# Patient Record
Sex: FEMALE | Race: NATIVE HAWAIIAN OR OTHER PACIFIC ISLANDER | NOT HISPANIC OR LATINO
[De-identification: names, ages, dates, MRNs, and addresses within clinical notes are randomized per-mention and may not be internally consistent; named-entity substitution may affect disease eponyms.]

---

## 2023-07-01 ENCOUNTER — RESULT REVIEW (OUTPATIENT)
Age: 38
End: 2023-07-01

## 2023-07-02 ENCOUNTER — INPATIENT (INPATIENT)
Facility: HOSPITAL | Age: 38
LOS: 0 days | Discharge: HOME | DRG: 819 | End: 2023-07-03
Attending: OBSTETRICS & GYNECOLOGY | Admitting: OBSTETRICS & GYNECOLOGY
Payer: COMMERCIAL

## 2023-07-02 ENCOUNTER — TRANSCRIPTION ENCOUNTER (OUTPATIENT)
Age: 38
End: 2023-07-02

## 2023-07-02 VITALS
HEART RATE: 97 BPM | WEIGHT: 184.97 LBS | SYSTOLIC BLOOD PRESSURE: 122 MMHG | DIASTOLIC BLOOD PRESSURE: 72 MMHG | HEIGHT: 66 IN | TEMPERATURE: 98 F | RESPIRATION RATE: 15 BRPM | OXYGEN SATURATION: 99 %

## 2023-07-02 DIAGNOSIS — O00.109 UNSPECIFIED TUBAL PREGNANCY WITHOUT INTRAUTERINE PREGNANCY: ICD-10-CM

## 2023-07-02 LAB
ALBUMIN SERPL ELPH-MCNC: 3.9 G/DL — SIGNIFICANT CHANGE UP (ref 3.3–5)
ALP SERPL-CCNC: 57 U/L — SIGNIFICANT CHANGE UP (ref 40–120)
ALT FLD-CCNC: 15 U/L — SIGNIFICANT CHANGE UP (ref 10–45)
ANION GAP SERPL CALC-SCNC: 12 MMOL/L — SIGNIFICANT CHANGE UP (ref 5–17)
APPEARANCE UR: CLEAR — SIGNIFICANT CHANGE UP
APTT BLD: 27.8 SEC — SIGNIFICANT CHANGE UP (ref 27.5–35.5)
AST SERPL-CCNC: 15 U/L — SIGNIFICANT CHANGE UP (ref 10–40)
BACTERIA # UR AUTO: NEGATIVE — SIGNIFICANT CHANGE UP
BASOPHILS # BLD AUTO: 0.03 K/UL — SIGNIFICANT CHANGE UP (ref 0–0.2)
BASOPHILS NFR BLD AUTO: 0.4 % — SIGNIFICANT CHANGE UP (ref 0–2)
BILIRUB SERPL-MCNC: 0.5 MG/DL — SIGNIFICANT CHANGE UP (ref 0.2–1.2)
BILIRUB UR-MCNC: NEGATIVE — SIGNIFICANT CHANGE UP
BLD GP AB SCN SERPL QL: NEGATIVE — SIGNIFICANT CHANGE UP
BUN SERPL-MCNC: 12 MG/DL — SIGNIFICANT CHANGE UP (ref 7–23)
CALCIUM SERPL-MCNC: 9.1 MG/DL — SIGNIFICANT CHANGE UP (ref 8.4–10.5)
CHLORIDE SERPL-SCNC: 104 MMOL/L — SIGNIFICANT CHANGE UP (ref 96–108)
CO2 SERPL-SCNC: 23 MMOL/L — SIGNIFICANT CHANGE UP (ref 22–31)
COLOR SPEC: SIGNIFICANT CHANGE UP
CREAT SERPL-MCNC: 0.63 MG/DL — SIGNIFICANT CHANGE UP (ref 0.5–1.3)
DIFF PNL FLD: NEGATIVE — SIGNIFICANT CHANGE UP
EGFR: 116 ML/MIN/1.73M2 — SIGNIFICANT CHANGE UP
EOSINOPHIL # BLD AUTO: 0.12 K/UL — SIGNIFICANT CHANGE UP (ref 0–0.5)
EOSINOPHIL NFR BLD AUTO: 1.6 % — SIGNIFICANT CHANGE UP (ref 0–6)
EPI CELLS # UR: 2 /HPF — SIGNIFICANT CHANGE UP
GLUCOSE SERPL-MCNC: 103 MG/DL — HIGH (ref 70–99)
GLUCOSE UR QL: NEGATIVE — SIGNIFICANT CHANGE UP
HCG SERPL-ACNC: 6043 MIU/ML — HIGH
HCT VFR BLD CALC: 34.4 % — LOW (ref 34.5–45)
HGB BLD-MCNC: 11.1 G/DL — LOW (ref 11.5–15.5)
HYALINE CASTS # UR AUTO: 0 /LPF — SIGNIFICANT CHANGE UP (ref 0–2)
IMM GRANULOCYTES NFR BLD AUTO: 0.3 % — SIGNIFICANT CHANGE UP (ref 0–0.9)
INR BLD: 1.03 RATIO — SIGNIFICANT CHANGE UP (ref 0.88–1.16)
KETONES UR-MCNC: NEGATIVE — SIGNIFICANT CHANGE UP
LEUKOCYTE ESTERASE UR-ACNC: NEGATIVE — SIGNIFICANT CHANGE UP
LYMPHOCYTES # BLD AUTO: 2.05 K/UL — SIGNIFICANT CHANGE UP (ref 1–3.3)
LYMPHOCYTES # BLD AUTO: 27.1 % — SIGNIFICANT CHANGE UP (ref 13–44)
MCHC RBC-ENTMCNC: 31.5 PG — SIGNIFICANT CHANGE UP (ref 27–34)
MCHC RBC-ENTMCNC: 32.3 GM/DL — SIGNIFICANT CHANGE UP (ref 32–36)
MCV RBC AUTO: 97.7 FL — SIGNIFICANT CHANGE UP (ref 80–100)
MONOCYTES # BLD AUTO: 0.28 K/UL — SIGNIFICANT CHANGE UP (ref 0–0.9)
MONOCYTES NFR BLD AUTO: 3.7 % — SIGNIFICANT CHANGE UP (ref 2–14)
NEUTROPHILS # BLD AUTO: 5.07 K/UL — SIGNIFICANT CHANGE UP (ref 1.8–7.4)
NEUTROPHILS NFR BLD AUTO: 66.9 % — SIGNIFICANT CHANGE UP (ref 43–77)
NITRITE UR-MCNC: NEGATIVE — SIGNIFICANT CHANGE UP
NRBC # BLD: 0 /100 WBCS — SIGNIFICANT CHANGE UP (ref 0–0)
PH UR: 7 — SIGNIFICANT CHANGE UP (ref 5–8)
PLATELET # BLD AUTO: 295 K/UL — SIGNIFICANT CHANGE UP (ref 150–400)
POTASSIUM SERPL-MCNC: 4.6 MMOL/L — SIGNIFICANT CHANGE UP (ref 3.5–5.3)
POTASSIUM SERPL-SCNC: 4.6 MMOL/L — SIGNIFICANT CHANGE UP (ref 3.5–5.3)
PROT SERPL-MCNC: 7 G/DL — SIGNIFICANT CHANGE UP (ref 6–8.3)
PROT UR-MCNC: SIGNIFICANT CHANGE UP
PROTHROM AB SERPL-ACNC: 11.9 SEC — SIGNIFICANT CHANGE UP (ref 10.5–13.4)
RBC # BLD: 3.52 M/UL — LOW (ref 3.8–5.2)
RBC # FLD: 13.9 % — SIGNIFICANT CHANGE UP (ref 10.3–14.5)
RBC CASTS # UR COMP ASSIST: 2 /HPF — SIGNIFICANT CHANGE UP (ref 0–4)
RH IG SCN BLD-IMP: POSITIVE — SIGNIFICANT CHANGE UP
SODIUM SERPL-SCNC: 139 MMOL/L — SIGNIFICANT CHANGE UP (ref 135–145)
SP GR SPEC: 1.02 — SIGNIFICANT CHANGE UP (ref 1.01–1.02)
UROBILINOGEN FLD QL: NEGATIVE — SIGNIFICANT CHANGE UP
WBC # BLD: 7.57 K/UL — SIGNIFICANT CHANGE UP (ref 3.8–10.5)
WBC # FLD AUTO: 7.57 K/UL — SIGNIFICANT CHANGE UP (ref 3.8–10.5)
WBC UR QL: 1 /HPF — SIGNIFICANT CHANGE UP (ref 0–5)

## 2023-07-02 PROCEDURE — 71045 X-RAY EXAM CHEST 1 VIEW: CPT | Mod: 26

## 2023-07-02 PROCEDURE — 99285 EMERGENCY DEPT VISIT HI MDM: CPT

## 2023-07-02 PROCEDURE — 88305 TISSUE EXAM BY PATHOLOGIST: CPT | Mod: 26

## 2023-07-02 PROCEDURE — 76817 TRANSVAGINAL US OBSTETRIC: CPT | Mod: 26

## 2023-07-02 PROCEDURE — 59151 TREAT ECTOPIC PREGNANCY: CPT

## 2023-07-02 PROCEDURE — 93975 VASCULAR STUDY: CPT | Mod: 26

## 2023-07-02 RX ORDER — HYDROMORPHONE HYDROCHLORIDE 2 MG/ML
0.5 INJECTION INTRAMUSCULAR; INTRAVENOUS; SUBCUTANEOUS
Refills: 0 | Status: DISCONTINUED | OUTPATIENT
Start: 2023-07-03 | End: 2023-07-03

## 2023-07-02 RX ORDER — ONDANSETRON 8 MG/1
4 TABLET, FILM COATED ORAL ONCE
Refills: 0 | Status: DISCONTINUED | OUTPATIENT
Start: 2023-07-03 | End: 2023-07-03

## 2023-07-02 RX ORDER — SODIUM CHLORIDE 9 MG/ML
1000 INJECTION, SOLUTION INTRAVENOUS
Refills: 0 | Status: DISCONTINUED | OUTPATIENT
Start: 2023-07-02 | End: 2023-07-02

## 2023-07-02 RX ORDER — SODIUM CHLORIDE 9 MG/ML
1000 INJECTION, SOLUTION INTRAVENOUS ONCE
Refills: 0 | Status: COMPLETED | OUTPATIENT
Start: 2023-07-02 | End: 2023-07-02

## 2023-07-02 RX ADMIN — SODIUM CHLORIDE 1000 MILLILITER(S): 9 INJECTION, SOLUTION INTRAVENOUS at 20:17

## 2023-07-02 NOTE — ED PROVIDER NOTE - OBJECTIVE STATEMENT
38-year-old female G3, P1 last menstrual period 4–15–23 presents to the emergency department after being told by her OB/GYN that she had a right tubal ectopic pregnancy on 6/24/2023.  Patient went to [facility] and was told to take a dose of methotrexate, recieved a dose on 6/34/23, second dose on 6/27/23 and third on 7/1/23.  Patient's HCG on 6/22 was 4181, 6/20 91550, 7/1 was 9/3/2004. patient was told she needs surgery and presents for second opinion 38-year-old female G3, P1 last menstrual period 4–15–23 presents to the emergency department after being told by her OB/GYN that she had a right tubal ectopic pregnancy on 6/24/2023.  Patient went to hospitals and was told to take a dose of methotrexate, recieved a dose on 6/34/23, second dose on 6/27/23 and third on 7/1/23.  Patient's HCG on 6/22 was 4181, 6/20 72509, 7/1 was 9/3/2004. patient was told she needs surgery and presents for second opinion. patient is not in pain. no vag discharge/ vag bleeding/ dysuria.

## 2023-07-02 NOTE — H&P ADULT - ASSESSMENT
37y/o  s/p MTX x3 at [facility] with persistently elevated bHCG with inappropriate downtrend and TVUS with FH. Patient in stable condition, VSS, no acute abdominal pain.   - Pt added on to OR schedule for LSC R salpingectomy  - Consents signed for laparoscopic left salpingectomy, possible left oophorectomy, possible right salpingectomy, possible laparotomy. Risks/benefits/alteratives explained to pt and partner, including bleeding, infection, damage to surrounding organs, and pain. Questions elicited and answered.   - NPO, LR@125  - Rh +, rhogam not indicated     D/W Dr. Adrianna Chaudhry PGY3

## 2023-07-02 NOTE — ED PROVIDER NOTE - CLINICAL SUMMARY MEDICAL DECISION MAKING FREE TEXT BOX
38-year-old female G3, P1 last menstrual period 4–15–23 presents to the emergency department after being told by her OB/GYN that she had a right tubal ectopic pregnancy on 6/24/2023. Concern for ectopic pregnancy. will get labs, US, consult obgyn for management options. patient not currently in pain and not requiring meds.

## 2023-07-02 NOTE — ED ADULT TRIAGE NOTE - CHIEF COMPLAINT QUOTE
pt dx with an ectopic had Methotrexate 3 doses already wants a second opinion  pt has no pain or bleeding Pt levels are not going down as expected possible surgery ans wants second opinion

## 2023-07-02 NOTE — ED PROVIDER NOTE - ATTENDING CONTRIBUTION TO CARE
Attending MD Lay: I personally have seen and examined this patient.  Resident note reviewed and agree on plan of care and except where noted.  See below for details.     Seen in [facility], accompanied by bernard  Ob/Gyn Dr. Simona Shipman    38F with no reported contributory PMH/Meds, PSHx , no known drug allergies presents to the ED with ectopic pregnancy.  Reports took home pregnancy around  and then saw her Ob Gyn who diagnosed her with ectopic and sent her to hospital.  .  Reports went to [facility] and had US and labs and had MTX on ,  and .  Reports was told HCG not decreasing sufficiently.  Reports is scheduled for D&C tomorrow but requests second opinion.  Mireille did not have abdominal pain, vaginal bleeding at any point and only knew about ectopic secondary to her Ob/Gyn telling her.  Denies chest pain, shortness of breath, abdominal pain, nausea, vomiting, diarrhea, urinary complaints, vaginal bleeding, vaginal discharge.  Reports first miscarriage was around 5 wks and was last year.    Exam:   General: NAD  HENT: head NCAT, airway patent  Eyes: anicteric, no conjunctival injection   Lungs: lungs CTAB with good inspiratory effort, no wheezing, no rhonchi, no rales  Cardiac: +S1S2, no obvious m/r/g  GI: abdomen soft with +BS, NT, ND  : no CVAT  MSK: ranging neck and extremities freely  Neuro: moving all extremities spontaneously, nonfocal  Psych: normal mood and affect     A/P: 38F with known outpatient ectopic, ?R, will obtain labs, US, and consult Ob/Gyn as needed Attending MD Lay: I personally have seen and examined this patient.  Resident note reviewed and agree on plan of care and except where noted.  See below for details.     Seen in [place] [facility], accompanied by bernard  Ob/Gyn Dr. Simona Shipman    38F with no reported contributory PMH/Meds, PSHx , no known drug allergies presents to the ED with ectopic pregnancy.  Reports took home pregnancy around  and then saw her Ob Gyn who diagnosed her with ectopic and sent her to hospital.  .  Reports went to [facility] and had US and labs and had MTX on ,  and .  Reports was told HCG not decreasing sufficiently.  Reports is scheduled for D&C tomorrow but requests second opinion.  Mireille did not have abdominal pain, vaginal bleeding at any point and only knew about ectopic secondary to her Ob/Gyn telling her.  Denies chest pain, shortness of breath, abdominal pain, nausea, vomiting, diarrhea, urinary complaints, vaginal bleeding, vaginal discharge.  Reports first miscarriage was around 5 wks and was last year.  LMP 4/15/23    Exam:   General: NAD  HENT: head NCAT, airway patent  Eyes: anicteric, no conjunctival injection   Lungs: lungs CTAB with good inspiratory effort, no wheezing, no rhonchi, no rales  Cardiac: +S1S2, no obvious m/r/g  GI: abdomen soft with +BS, NT, ND  : no CVAT  MSK: ranging neck and extremities freely  Neuro: moving all extremities spontaneously, nonfocal  Psych: normal mood and affect     A/P: 38F with known outpatient ectopic, ?R, will obtain labs, US, and consult Ob/Gyn as needed

## 2023-07-02 NOTE — PRE-ANESTHESIA EVALUATION ADULT - NSANTHPMHFT_GEN_ALL_CORE
39 y/o female;  right tubal ectopic pregnancy on 6/24/2023.  a dose of methotrexate was received  on 6/34/23, second dose on 6/27/23 and third on 7/1/23. with persistent elevated HCG  US pelvis:  right adnexal ectopic pregnancy. Trace amount of fluid within the pelvis without evidence of adnexal hematoma.

## 2023-07-02 NOTE — ED PROVIDER NOTE - PHYSICAL EXAMINATION
PHYSICAL EXAM:  CONSTITUTIONAL: Well appearing, awake, alert, oriented to person, place, time/situation and in no apparent distress.  HEAD: Atraumatic  EYES: Clear bilaterally, pupils equal, round and reactive to light.  ENMT: Airway patent, Nasal mucosa clear. Mouth with normal mucosa. Uvula is midline.   CARDIAC: Normal rate, regular rhythm. +S1/S2. No murmurs, rubs or gallops.  RESPIRATORY: Breathing unlabored. Breath sounds clear and equal bilaterally.  ABDOMEN:  Soft, nontender, nondistended. No rebound tenderness or guarding.  NEUROLOGICAL: Alert and oriented, no focal deficits, no motor or sensory deficits. Sensation intact x4 extremities.  SKIN: Skin warm and dry. No evidence of rashes or lesions.

## 2023-07-02 NOTE — ED ADULT NURSE NOTE - OBJECTIVE STATEMENT
37 y/o F no pertinent PMH presented to ED for ectopic pregnancy follow up. Pt states she has been being treated for an ectopic pregnancy at [facility], states her hcg levels are decreasing but not as rapidly as they should be, has had three doses of methotrexate, last dose was last night, pt states [facility] MDs have recommended surgery but she wanted a second opinion so came to [facility] ED for eval. Pt has no physical complaints at this time, denies any vaginal bleeding or discharge, abdominal cramping, N/V, blurry vision, headaches, chest pain, difficulty breathing, GI/ symptoms at this time. A&Ox4, breathing spontaneously and unlabored, speaking full sentences, moving all extremities, is ambulatory. Appropriate safety measures in place, call bell within reach, family at bedside. 37 y/o F no pertinent PMH  presented to ED for ectopic pregnancy follow up. Pt states she has been being treated for an ectopic pregnancy at [facility], states her hcg levels are decreasing but not as rapidly as they should be, has had three doses of methotrexate, last dose was last night, pt states [facility] MDs have recommended surgery but she wanted a second opinion so came to [facility] ED for eval. Pt has no physical complaints at this time, denies any vaginal bleeding or discharge, abdominal cramping, N/V, blurry vision, headaches, chest pain, difficulty breathing, GI/ symptoms at this time. A&Ox4, breathing spontaneously and unlabored, speaking full sentences, moving all extremities, is ambulatory. Appropriate safety measures in place, call bell within reach, family at bedside.

## 2023-07-02 NOTE — PRE-ANESTHESIA EVALUATION ADULT - NSANTHOSAYNRD_GEN_A_CORE
No. CAYETANO screening performed.  STOP BANG Legend: 0-2 = LOW Risk; 3-4 = INTERMEDIATE Risk; 5-8 = HIGH Risk

## 2023-07-02 NOTE — H&P ADULT - NSHPPHYSICALEXAM_GEN_ALL_CORE
Vital Signs Last 24 Hrs  T(C): 36.7 (02 Jul 2023 20:56), Max: 36.7 (02 Jul 2023 14:39)  T(F): 98.1 (02 Jul 2023 19:26), Max: 98.1 (02 Jul 2023 19:26)  HR: 85 (02 Jul 2023 20:56) (85 - 97)  BP: 110/70 (02 Jul 2023 20:56) (110/70 - 122/72)  BP(mean): 82 (02 Jul 2023 20:56) (82 - 82)  RR: 18 (02 Jul 2023 20:56) (15 - 18)  SpO2: 100% (02 Jul 2023 20:56) (99% - 100%)    Parameters below as of 02 Jul 2023 19:26  Patient On (Oxygen Delivery Method): room air      Physical Exam:   General: sitting comfortably in bed, NAD   CV: RRR  Lungs: CTAB  Back: No CVA tenderness  Abd: Soft, non-tender, non-distended.  Bowel sounds present.    Ext: non-tender b/l, no edema

## 2023-07-02 NOTE — H&P ADULT - HISTORY OF PRESENT ILLNESS
39y/o  LMP 4/ / presenting to the ED complaining with a known ectopic pregnancy for a second opinion.   Patient reports ___. She denies ___.    Name of GYN Physician:     OBHx:     - SAB x1  - pC/S ()  GYNHx: Denies fibroids, cysts, endometriosis, STI's, Abnormal pap smears   PMHx: Denies  PSHx: C/S x1  Meds: None  Allergies: NKDA 39y/o  LMP  presenting to the ED complaining with a known ectopic pregnancy for a second opinion. She was being followed in [facility] and received MTX on , , and .  Patient denies abdominal pain, vaginal bleeding, lightheadedness, dizziness, nausea, vomiting.    Name of GYN Physician: unknown, affiliated with [facility]    OBHx:     - SAB x1  - pC/S ()  GYNHx: Denies fibroids, cysts, endometriosis, STI's, Abnormal pap smears   PMHx: Denies  PSHx: C/S x1  Meds: None  Allergies: NKDA

## 2023-07-02 NOTE — H&P ADULT - NSHPLABSRESULTS_GEN_ALL_CORE
LABS:               11.1   7.57  )-----------( 295      ( 2023 16:33 )             34.4     07-02  139  |  104  |  12  ----------------------------<  103<H>  4.6   |  23  |  0.63    Ca    9.1      2023 16:33  TPro  7.0  /  Alb  3.9  /  TBili  0.5  /  DBili  x   /  AST  15  /  ALT  15  /  AlkPhos  57  07-02  PT/INR - ( 2023 16:33 )   PT: 11.9 sec;   INR: 1.03 ratio    PTT - ( 2023 16:33 )  PTT:27.8 sec    Urinalysis Basic - ( 2023 16:41 )  Color: Light Yellow / Appearance: Clear / S.022 / pH: x  Gluc: x / Ketone: Negative  / Bili: Negative / Urobili: Negative   Blood: x / Protein: Trace / Nitrite: Negative   Leuk Esterase: Negative / RBC: 2 /hpf / WBC 1 /HPF   Sq Epi: x / Non Sq Epi: x / Bacteria: Negative    RADIOLOGY & ADDITIONAL STUDIES: LABS:               11.1   7.57  )-----------( 295      ( 2023 16:33 )             34.4     07-02  139  |  104  |  12  ----------------------------<  103<H>  4.6   |  23  |  0.63    Ca    9.1      2023 16:33  TPro  7.0  /  Alb  3.9  /  TBili  0.5  /  DBili  x   /  AST  15  /  ALT  15  /  AlkPhos  57  07-02  PT/INR - ( 2023 16:33 )   PT: 11.9 sec;   INR: 1.03 ratio    PTT - ( 2023 16:33 )  PTT:27.8 sec    Urinalysis Basic - ( 2023 16:41 )  Color: Light Yellow / Appearance: Clear / S.022 / pH: x  Gluc: x / Ketone: Negative  / Bili: Negative / Urobili: Negative   Blood: x / Protein: Trace / Nitrite: Negative   Leuk Esterase: Negative / RBC: 2 /hpf / WBC 1 /HPF   Sq Epi: x / Non Sq Epi: x / Bacteria: Negative    RADIOLOGY & ADDITIONAL STUDIES:  TVUS ():  FINDINGS:  Uterus: No intrauterine pregnancy is identified. The endometrial lining   measures 8 mm. There is 1.5 cm left intramural myoma    Right ovary: 3.3 cm x 1.9 cm x 2.4 cm. Within normal limits.    There is live ectopically pregnancy in the right adnexa with overall   measurement of 1.9 x 1.8 cm. The central cystic component measures 8mm.   The fetal pole measures 4 mm, corresponding to 6 weeks gestation.. Fetal   heart rate is identified. There is no adjacent hematoma.    Left ovary: 4.1 cm x 3.5 cm x 3.2 cm. There is 2.9 cm simple appearing   left ovarian cyst.    Fluid: Trace amount of fluid in the pelvis. There is no hematoma.    IMPRESSION:  Live right adnexal ectopic pregnancy.    Trace amount of fluid within the pelvis without evidence of adnexal   hematoma.

## 2023-07-02 NOTE — ED PROVIDER NOTE - PROGRESS NOTE DETAILS
Attending MD Lay: Received call from rads, +R ectopic, Ob/Gyn paged Attending MD Lay: Spoke to Ob/Gyn, will come evaluate patient Attending MD Lay: Patient evaluated by Ob/Gyn team, will admit to Dr. Flores, for OR

## 2023-07-03 VITALS
RESPIRATION RATE: 17 BRPM | HEART RATE: 91 BPM | SYSTOLIC BLOOD PRESSURE: 118 MMHG | DIASTOLIC BLOOD PRESSURE: 73 MMHG | TEMPERATURE: 97 F | OXYGEN SATURATION: 99 %

## 2023-07-03 PROCEDURE — 88305 TISSUE EXAM BY PATHOLOGIST: CPT

## 2023-07-03 PROCEDURE — 76817 TRANSVAGINAL US OBSTETRIC: CPT

## 2023-07-03 PROCEDURE — 99285 EMERGENCY DEPT VISIT HI MDM: CPT

## 2023-07-03 PROCEDURE — 93975 VASCULAR STUDY: CPT

## 2023-07-03 PROCEDURE — 86901 BLOOD TYPING SEROLOGIC RH(D): CPT

## 2023-07-03 PROCEDURE — 85025 COMPLETE CBC W/AUTO DIFF WBC: CPT

## 2023-07-03 PROCEDURE — 84702 CHORIONIC GONADOTROPIN TEST: CPT

## 2023-07-03 PROCEDURE — C9399: CPT

## 2023-07-03 PROCEDURE — 86900 BLOOD TYPING SEROLOGIC ABO: CPT

## 2023-07-03 PROCEDURE — 85730 THROMBOPLASTIN TIME PARTIAL: CPT

## 2023-07-03 PROCEDURE — 86850 RBC ANTIBODY SCREEN: CPT

## 2023-07-03 PROCEDURE — 71045 X-RAY EXAM CHEST 1 VIEW: CPT

## 2023-07-03 PROCEDURE — 85610 PROTHROMBIN TIME: CPT

## 2023-07-03 PROCEDURE — 87086 URINE CULTURE/COLONY COUNT: CPT

## 2023-07-03 PROCEDURE — 80053 COMPREHEN METABOLIC PANEL: CPT

## 2023-07-03 PROCEDURE — 93005 ELECTROCARDIOGRAM TRACING: CPT

## 2023-07-03 PROCEDURE — 81001 URINALYSIS AUTO W/SCOPE: CPT

## 2023-07-03 RX ORDER — ACETAMINOPHEN 500 MG
3 TABLET ORAL
Qty: 0 | Refills: 0 | DISCHARGE

## 2023-07-03 RX ORDER — ONDANSETRON 8 MG/1
4 TABLET, FILM COATED ORAL ONCE
Refills: 0 | Status: DISCONTINUED | OUTPATIENT
Start: 2023-07-03 | End: 2023-07-03

## 2023-07-03 RX ORDER — OXYCODONE HYDROCHLORIDE 5 MG/1
10 TABLET ORAL ONCE
Refills: 0 | Status: DISCONTINUED | OUTPATIENT
Start: 2023-07-03 | End: 2023-07-03

## 2023-07-03 RX ORDER — OXYCODONE HYDROCHLORIDE 5 MG/1
1 TABLET ORAL
Qty: 8 | Refills: 0
Start: 2023-07-03

## 2023-07-03 RX ORDER — OXYCODONE HYDROCHLORIDE 5 MG/1
5 TABLET ORAL ONCE
Refills: 0 | Status: DISCONTINUED | OUTPATIENT
Start: 2023-07-03 | End: 2023-07-03

## 2023-07-03 RX ORDER — SODIUM CHLORIDE 9 MG/ML
1000 INJECTION, SOLUTION INTRAVENOUS
Refills: 0 | Status: DISCONTINUED | OUTPATIENT
Start: 2023-07-03 | End: 2023-07-03

## 2023-07-03 RX ORDER — IBUPROFEN 200 MG
1 TABLET ORAL
Qty: 0 | Refills: 0 | DISCHARGE

## 2023-07-03 RX ADMIN — HYDROMORPHONE HYDROCHLORIDE 0.5 MILLIGRAM(S): 2 INJECTION INTRAMUSCULAR; INTRAVENOUS; SUBCUTANEOUS at 02:30

## 2023-07-03 RX ADMIN — HYDROMORPHONE HYDROCHLORIDE 0.5 MILLIGRAM(S): 2 INJECTION INTRAMUSCULAR; INTRAVENOUS; SUBCUTANEOUS at 02:15

## 2023-07-03 RX ADMIN — SODIUM CHLORIDE 125 MILLILITER(S): 9 INJECTION, SOLUTION INTRAVENOUS at 02:19

## 2023-07-03 NOTE — BRIEF OPERATIVE NOTE - OPERATION/FINDINGS
On exam under anesthesia, 10wk size mobile uterus palpated, no masses appreciated in the adnexa  On laparoscopy, mass identified in the right fallopian tube

## 2023-07-03 NOTE — ASU DISCHARGE PLAN (ADULT/PEDIATRIC) - CARE PROVIDER_API CALL
[facility],   Oncology [facility]  [address] [place] [address]  [phone]  Phone: (   )    -  Fax: (   )    -  Follow Up Time:    [facility],   [address] [place] [address] [address], [place] [address] [phone]  Phone: (   )    -  Fax: (   )    -  Follow Up Time:

## 2023-07-03 NOTE — ASU DISCHARGE PLAN (ADULT/PEDIATRIC) - PROVIDER TOKENS
FREE:[LAST:[[facility]],PHONE:[(   )    -],FAX:[(   )    -],ADDRESS:[[place] [address]  [phone]]] FREE:[LAST:[[facility]],PHONE:[(   )    -],FAX:[(   )    -],ADDRESS:[[address] [place] [address] [address], [place] [address] [phone]]]

## 2023-07-03 NOTE — CHART NOTE - NSCHARTNOTEFT_GEN_A_CORE
R1 OBGYN POST-OP CHECK    S: Patient seen and evaluated at bedside.  Pt awake and alert resting comfortably in bed  Patient reports pain controlled with analgesia. Pt denies N/V, SOB, CP, palpitations, fever/chills. Tolerating clears. +OOB. Voiding spontaneously.    O:   T(C): 36.2 (07-03-23 @ 02:30), Max: 36.2 (07-03-23 @ 02:30)  HR: 97 (07-03-23 @ 03:32) (82 - 101)  BP: 133/69 (07-03-23 @ 03:32) (99/56 - 133/69)  RR: 16 (07-03-23 @ 03:32) (14 - 16)  SpO2: 99% (07-03-23 @ 03:32) (94% - 99%)  Wt(kg): --  I&O's Summary    02 Jul 2023 07:01  -  03 Jul 2023 04:06  --------------------------------------------------------  IN: 370 mL / OUT: 0 mL / NET: 370 mL        Gen: Resting comfortably in bed, NAD  CV: S1S2, RRR  Lungs: CTA B/L  Abd: soft, appropriately tender, BS x 4 quadrants.    Inc: 3 port sites clean/dry/intact with dermabond in place  Ext: SCD's in place and functional, non-tender b/l, no edema        A/P: 38y Female s/p LSC R salpingectomy, EBL 5. Patient is clinically stable and doing well postoperatively.    Neuro: PO Analgesia PRN   CV: Hemodynamically stable.   Pulm: Saturating well on room air.  Encourage OOB and incentive spirometer use.   GI: Advance to regular diet. Anti-emetics PRN.  : Voiding spontaneously with adequate urine output of 250cc  FEN: Electrolytes: LR@125cc/hr.   Heme: DVT ppx w/ SCD's while in bed. Early ambulation, initially with assistance then as tolerated.   ID: Afebrile  Endo: No active issues   Dispo: Discharge from PACU when criteria met.     Zena Scales, PGY2

## 2023-07-03 NOTE — ASU DISCHARGE PLAN (ADULT/PEDIATRIC) - ASU DC SPECIAL INSTRUCTIONSFT
Postoperative Instructions      Pain control     For pain control, take the followin. Motrin 600mg four times a day, take with food  2. Add Tylenol 975 four times a day, alternated with motrin  3. Add oxycodone as needed for severe pain not managed well by motrin and tylenol. A prescription has been sent to your pharmacy on file.     Motrin and Tylenol can be obtained over the counter.    Postoperative restrictions   Do not drive or make important decisions for 24 hours after anesthesia. You may feel drowsy for 24 hours and should have a responsible adult with you during that time. Nothing in the vagina (tampons, sexual intercourse), No tub baths, pools or hot tubs for 6 weeks (showers are ok!). No lifting anything heavier than 15 lbs, no strenuous exercise for 6 weeks after surgery. Do not pull or cut any stitches that you see around your incision.         Vaginal bleeding   Spotting per vagina is normal in first few days after surgery. If you are soaking 1 pad per hour, that is not normal and you should notify your doctor's office and seek medical attention right away.        Signs of Infection  Some postoperative pain and discomfort is normal. However, if you experience any of the following, you may be developing an infection and should be seen by your doctor: pain that does not get better with the oral medications listed above, fever greater than 100.4F, foul smelling discharge coming from the surgical site, nausea and vomiting that does not stop (especially if you are unable to tolerate oral intake), or inability to urinate. If you experience any of these symptoms, call your doctor's office to be seen right away.    Follow Up  Call your doctor's office to schedule a postoperative appointment in 2 weeks. The results from the procedure will be discussed with you at that time.

## 2023-07-03 NOTE — BRIEF OPERATIVE NOTE - NSICDXBRIEFPROCEDURE_GEN_ALL_CORE_FT
PROCEDURES:  Laparoscopic right salpingectomy for ectopic pregnancy 03-Jul-2023 02:11:27  Zena Scales

## 2023-07-04 LAB
CULTURE RESULTS: SIGNIFICANT CHANGE UP
SPECIMEN SOURCE: SIGNIFICANT CHANGE UP

## 2023-07-12 LAB — SURGICAL PATHOLOGY STUDY: SIGNIFICANT CHANGE UP

## 2023-07-18 PROBLEM — Z00.00 ENCOUNTER FOR PREVENTIVE HEALTH EXAMINATION: Status: ACTIVE | Noted: 2023-07-18

## 2023-07-26 ENCOUNTER — APPOINTMENT (OUTPATIENT)
Dept: OBGYN | Facility: HOSPITAL | Age: 38
End: 2023-07-26

## 2023-08-16 ENCOUNTER — APPOINTMENT (OUTPATIENT)
Dept: OBGYN | Facility: HOSPITAL | Age: 38
End: 2023-08-16

## 2023-08-28 ENCOUNTER — APPOINTMENT (OUTPATIENT)
Dept: OBGYN | Facility: CLINIC | Age: 38
End: 2023-08-28
Payer: COMMERCIAL

## 2023-08-28 DIAGNOSIS — Z80.3 FAMILY HISTORY OF MALIGNANT NEOPLASM OF BREAST: ICD-10-CM

## 2023-08-28 DIAGNOSIS — Z78.9 OTHER SPECIFIED HEALTH STATUS: ICD-10-CM

## 2023-08-28 DIAGNOSIS — Z82.49 FAMILY HISTORY OF ISCHEMIC HEART DISEASE AND OTHER DISEASES OF THE CIRCULATORY SYSTEM: ICD-10-CM

## 2023-08-28 DIAGNOSIS — O00.101 RIGHT TUBAL PREGNANCY WITHOUT INTRAUTERINE PREGNANCY: ICD-10-CM

## 2023-08-28 DIAGNOSIS — Z01.419 ENCOUNTER FOR GYNECOLOGICAL EXAMINATION (GENERAL) (ROUTINE) W/OUT ABNORMAL FINDINGS: ICD-10-CM

## 2023-08-28 PROCEDURE — 99395 PREV VISIT EST AGE 18-39: CPT

## 2023-08-28 RX ORDER — NORETHINDRONE ACETATE 5 MG/1
5 TABLET ORAL
Qty: 30 | Refills: 0 | Status: COMPLETED | COMMUNITY
Start: 2023-05-31

## 2023-08-28 RX ORDER — OXYCODONE 5 MG/1
5 TABLET ORAL
Qty: 8 | Refills: 0 | Status: COMPLETED | COMMUNITY
Start: 2023-07-03

## 2023-08-28 NOTE — HISTORY OF PRESENT ILLNESS
[Patient reported mammogram was normal] : Patient reported mammogram was normal [Patient reported breast sonogram was normal] : Patient reported breast sonogram was normal [FreeTextEntry1] : 37YO  LMP 8/5 s/p laparoscopic right salpingectomy for ectopic on 7/2 by Dr Irene, She is here for postop check and annual checkup. [Mammogramdate] : 1/23 [BreastSonogramDate] : 1/23 [PapSmeardate] : 2022

## 2023-08-29 LAB — HPV HIGH+LOW RISK DNA PNL CVX: NOT DETECTED

## 2023-08-30 LAB — CYTOLOGY CVX/VAG DOC THIN PREP: NORMAL

## 2023-11-08 ENCOUNTER — APPOINTMENT (OUTPATIENT)
Dept: HUMAN REPRODUCTION | Facility: CLINIC | Age: 38
End: 2023-11-08

## 2024-11-22 ENCOUNTER — TRANSCRIPTION ENCOUNTER (OUTPATIENT)
Age: 39
End: 2024-11-22

## 2024-11-22 ENCOUNTER — EMERGENCY (EMERGENCY)
Facility: HOSPITAL | Age: 39
LOS: 1 days | Discharge: HOME | End: 2024-11-22
Attending: EMERGENCY MEDICINE
Payer: COMMERCIAL

## 2024-11-22 VITALS
HEIGHT: 65 IN | HEART RATE: 107 BPM | TEMPERATURE: 99 F | RESPIRATION RATE: 18 BRPM | WEIGHT: 199.96 LBS | OXYGEN SATURATION: 100 % | SYSTOLIC BLOOD PRESSURE: 149 MMHG | DIASTOLIC BLOOD PRESSURE: 86 MMHG

## 2024-11-22 LAB
APTT BLD: 28.2 SEC — SIGNIFICANT CHANGE UP (ref 24.5–35.6)
BASOPHILS # BLD AUTO: 0.03 K/UL — SIGNIFICANT CHANGE UP (ref 0–0.2)
BASOPHILS NFR BLD AUTO: 0.3 % — SIGNIFICANT CHANGE UP (ref 0–2)
BLD GP AB SCN SERPL QL: NEGATIVE — SIGNIFICANT CHANGE UP
EOSINOPHIL # BLD AUTO: 0.14 K/UL — SIGNIFICANT CHANGE UP (ref 0–0.5)
EOSINOPHIL NFR BLD AUTO: 1.5 % — SIGNIFICANT CHANGE UP (ref 0–6)
HCG SERPL-ACNC: 8266 MIU/ML — HIGH
HCT VFR BLD CALC: 36 % — SIGNIFICANT CHANGE UP (ref 34.5–45)
HGB BLD-MCNC: 11.5 G/DL — SIGNIFICANT CHANGE UP (ref 11.5–15.5)
IMM GRANULOCYTES NFR BLD AUTO: 0.2 % — SIGNIFICANT CHANGE UP (ref 0–0.9)
INR BLD: 0.98 RATIO — SIGNIFICANT CHANGE UP (ref 0.85–1.16)
LYMPHOCYTES # BLD AUTO: 3.07 K/UL — SIGNIFICANT CHANGE UP (ref 1–3.3)
LYMPHOCYTES # BLD AUTO: 32.8 % — SIGNIFICANT CHANGE UP (ref 13–44)
MCHC RBC-ENTMCNC: 30.1 PG — SIGNIFICANT CHANGE UP (ref 27–34)
MCHC RBC-ENTMCNC: 31.9 G/DL — LOW (ref 32–36)
MCV RBC AUTO: 94.2 FL — SIGNIFICANT CHANGE UP (ref 80–100)
MONOCYTES # BLD AUTO: 0.51 K/UL — SIGNIFICANT CHANGE UP (ref 0–0.9)
MONOCYTES NFR BLD AUTO: 5.4 % — SIGNIFICANT CHANGE UP (ref 2–14)
NEUTROPHILS # BLD AUTO: 5.6 K/UL — SIGNIFICANT CHANGE UP (ref 1.8–7.4)
NEUTROPHILS NFR BLD AUTO: 59.8 % — SIGNIFICANT CHANGE UP (ref 43–77)
NRBC # BLD: 0 /100 WBCS — SIGNIFICANT CHANGE UP (ref 0–0)
PLATELET # BLD AUTO: 308 K/UL — SIGNIFICANT CHANGE UP (ref 150–400)
PROTHROM AB SERPL-ACNC: 11.2 SEC — SIGNIFICANT CHANGE UP (ref 9.9–13.4)
RBC # BLD: 3.82 M/UL — SIGNIFICANT CHANGE UP (ref 3.8–5.2)
RBC # FLD: 14 % — SIGNIFICANT CHANGE UP (ref 10.3–14.5)
RH IG SCN BLD-IMP: POSITIVE — SIGNIFICANT CHANGE UP
WBC # BLD: 9.37 K/UL — SIGNIFICANT CHANGE UP (ref 3.8–10.5)
WBC # FLD AUTO: 9.37 K/UL — SIGNIFICANT CHANGE UP (ref 3.8–10.5)

## 2024-11-22 PROCEDURE — 36000 PLACE NEEDLE IN VEIN: CPT

## 2024-11-22 PROCEDURE — 93975 VASCULAR STUDY: CPT | Mod: 26

## 2024-11-22 PROCEDURE — 86850 RBC ANTIBODY SCREEN: CPT

## 2024-11-22 PROCEDURE — 85730 THROMBOPLASTIN TIME PARTIAL: CPT

## 2024-11-22 PROCEDURE — 76817 TRANSVAGINAL US OBSTETRIC: CPT | Mod: 26

## 2024-11-22 PROCEDURE — 86901 BLOOD TYPING SEROLOGIC RH(D): CPT

## 2024-11-22 PROCEDURE — 80053 COMPREHEN METABOLIC PANEL: CPT

## 2024-11-22 PROCEDURE — 84702 CHORIONIC GONADOTROPIN TEST: CPT

## 2024-11-22 PROCEDURE — 85025 COMPLETE CBC W/AUTO DIFF WBC: CPT

## 2024-11-22 PROCEDURE — 93975 VASCULAR STUDY: CPT

## 2024-11-22 PROCEDURE — 86900 BLOOD TYPING SEROLOGIC ABO: CPT

## 2024-11-22 PROCEDURE — 99285 EMERGENCY DEPT VISIT HI MDM: CPT | Mod: 25

## 2024-11-22 PROCEDURE — 76817 TRANSVAGINAL US OBSTETRIC: CPT

## 2024-11-22 PROCEDURE — 99285 EMERGENCY DEPT VISIT HI MDM: CPT

## 2024-11-22 PROCEDURE — 85610 PROTHROMBIN TIME: CPT

## 2024-11-22 NOTE — ED PROVIDER NOTE - NSFOLLOWUPINSTRUCTIONS_ED_ALL_ED_FT
You were evaluated in the emergency department today for concern of possible ectopic pregnancy.  Transvaginal ultrasound performed in the emergency department showed early intrauterine gestational sac containing yolk sac.  Discussed case with our OB/GYN team, recommending following up with your OB physician in 2 days for repeat hCG, and repeat transvaginal ultrasound in 1 week.  Please call them tomorrow to make an appointment. Please return to the emergency department if you develop any worsening symptoms, abdominal pain, vomiting, vaginal bleeding.

## 2024-11-22 NOTE — ED PROVIDER NOTE - PATIENT PORTAL LINK FT
You can access the FollowMyHealth Patient Portal offered by [facility] by registering at the following website: [email or web address]. By joining [employer]’s FollowMyHealth portal, you will also be able to view your health information using other applications (apps) compatible with our system.

## 2024-11-22 NOTE — ED PROVIDER NOTE - TOBACCO USE
Records received from [facility], Recommendation Preamble: The following recommendations were made during the visit: Detail Level: Zone Recommendations (Free Text): hydrocortisone 10 Never smoker

## 2024-11-22 NOTE — ED PROVIDER NOTE - PROGRESS NOTE DETAILS
Lab work nonactionable, hCG 8266.  Transvaginal ultrasound showing early intrauterine gestational sac containing yolk sac.  Discussed case with OB/GYN resident, recommend patient follow-up outpatient with her OB/GYN, follow-up for serial hCGs, repeat pelvic ultrasound.  Patient informed of findings.  Patient will call OB/GYN first thing in the morning to make follow-up appointment.  Will discharge home.  Answered all questions and gave return precautions. ALMA Curry MD: Pt signed out to me with a h/o PUO, US at OSH demonstrating possible cornual pregnancy vs. tubal pregnancy. Pt asymptomatic, no AP/VB. Pt was pending TVUS / hCG. TVUS demonstrates an early IUP with yolk sac. Results were d/w OBGYN resident who recommends repeat US and hCG within 1 week. Discussed this with patient, all questions were answered. Feels comfortable going home. Requests referral for a [facility] GYN near her home - will place referral.

## 2024-11-22 NOTE — ED PROVIDER NOTE - ATTENDING CONTRIBUTION TO CARE
Attending MD Lay: I personally have seen and examined this patient.  Resident note reviewed and agree on plan of care and except where noted.  See below for details.     Seen in [place] [facility], accompanied by sister    39F with PMH/PSH including ectopic pregnancy (7/3/23 s/p lap R salpingectomy, Dr. Avelina Randhawa) presents to the ED with outpatient Gyn concern for L ectopic pregnancy.  LMP 10/8/24.   (reports previous miscarriage and ectopic).  Reports was starting process to undergo IVF when she was told that she was pregnant.  Reports since 24 has been having weekly US but had not been able to visualize pregnancy until today when she was told it was an ectopic.  Denies abdominal pain, nausea, vomiting, diarrhea, bloody or black stools. Denies dysuria, hematuria, change in urinary habits including frequency, urgency. Denies chest pain, shortness of breath, palpitations. Denies vaginal discharge, vaginal bleeding.  Reports at last ectopic was attempted to be managed medically with three injections but then had to have salpingectomy.    Exam:   General: NAD  HENT: head NCAT, airway patent  Eyes: anicteric, no conjunctival injection   Lungs: lungs CTAB with good inspiratory effort, no wheezing, no rhonchi, no rales  Cardiac: +S1S2, no obvious m/r/g  GI: abdomen soft with +BS, NT, ND  : no CVAT  MSK: ranging neck and extremities freely  Neuro: moving all extremities spontaneously, nonfocal  Psych: normal mood and affect     A/P: 39F with reported outpatient ectopic pregnancy, will obtain labs including HCG, will obtain US, will consult Ob/Gyn

## 2024-11-22 NOTE — ED ADULT NURSE NOTE - OBJECTIVE STATEMENT
39 y.o F A&OX4 w. hx of ectopic pregnancy and tubal salpingectomy presents to ED complaining of ectopic pregnancy. Pt was advised to come in by MD after confirmed ectopic pregnancy. Pt states that she is about 6 weeks ago. Pt has had ectopic in the past where she had to get part of her fallopian tubes removed. Pt denies any spotting, cramping and abdominal pain at this time. VSS at this time.

## 2024-11-22 NOTE — ED PROVIDER NOTE - CLINICAL SUMMARY MEDICAL DECISION MAKING FREE TEXT BOX
Wally Mahoney MD, PGY3  39-year-old female G4, P1 presenting to emergency department with concern of ectopic pregnancy.  Last menstrual period October 8.  History of ectopic pregnancy 1 year prior with failed medical management now status post right oophorectomy.  Patient had follow-up with OB/GYN today, Dr. Castro of [facility] and was told to go to emergency department for concern of ectopic.  Patient presented to [place] emergency department and had ultrasound showing eccentric located gestational sac in the region of the left uterine cornua which may represent cornual pregnancy versus a tubal ectopic pregnancy, recommended MRI for further delineation.  Patient states that she had her prior ectopic managed at [facility] and prefers to have further management and care of this ectopic at this hospital so decided to leave [place] [facility] emergency department prior to receiving any additional imaging.  Patient denies fever, headache, chest pain, shortness of breath, abdominal pain, nausea, vomiting, vaginal bleeding, diarrhea.    Gen: No acute distress  HEENT: EOMI, no nasal discharge, mucous membranes moist  CV: RRR, +S1/S2, no M/R/G, 2+ radial pulses b/l  Resp: CTAB, no W/R/R, no accessory muscle use, no increased work of breathing  GI: Abdomen soft non-distended, NTTP  MSK: No open wounds, no bruising, no LE edema  Neuro: A&Ox4, following commands, moving all four extremities spontaneously  Psych: appropriate mood    In the emergency department patient is hemodynamically stable, afebrile.  Patient well-appearing in no acute distress.  No abdominal tenderness on exam.  Given known outpatient imaging, high suspicion for possible ectopic pregnancy.  Will obtain repeat imaging, lab work, likely OB consultation pending workup.  Will reassess.

## 2024-11-23 VITALS
RESPIRATION RATE: 18 BRPM | TEMPERATURE: 98 F | DIASTOLIC BLOOD PRESSURE: 70 MMHG | OXYGEN SATURATION: 100 % | SYSTOLIC BLOOD PRESSURE: 120 MMHG | HEART RATE: 85 BPM

## 2024-11-23 LAB
ALBUMIN SERPL ELPH-MCNC: 3.8 G/DL — SIGNIFICANT CHANGE UP (ref 3.3–5)
ALP SERPL-CCNC: 67 U/L — SIGNIFICANT CHANGE UP (ref 40–120)
ALT FLD-CCNC: 6 U/L — LOW (ref 10–45)
ANION GAP SERPL CALC-SCNC: 13 MMOL/L — SIGNIFICANT CHANGE UP (ref 5–17)
AST SERPL-CCNC: 19 U/L — SIGNIFICANT CHANGE UP (ref 10–40)
BILIRUB SERPL-MCNC: 0.4 MG/DL — SIGNIFICANT CHANGE UP (ref 0.2–1.2)
BUN SERPL-MCNC: 10 MG/DL — SIGNIFICANT CHANGE UP (ref 7–23)
CALCIUM SERPL-MCNC: 8.7 MG/DL — SIGNIFICANT CHANGE UP (ref 8.4–10.5)
CHLORIDE SERPL-SCNC: 102 MMOL/L — SIGNIFICANT CHANGE UP (ref 96–108)
CO2 SERPL-SCNC: 20 MMOL/L — LOW (ref 22–31)
CREAT SERPL-MCNC: 0.58 MG/DL — SIGNIFICANT CHANGE UP (ref 0.5–1.3)
EGFR: 118 ML/MIN/1.73M2 — SIGNIFICANT CHANGE UP
GLUCOSE SERPL-MCNC: 85 MG/DL — SIGNIFICANT CHANGE UP (ref 70–99)
POTASSIUM SERPL-MCNC: 4.2 MMOL/L — SIGNIFICANT CHANGE UP (ref 3.5–5.3)
POTASSIUM SERPL-SCNC: 4.2 MMOL/L — SIGNIFICANT CHANGE UP (ref 3.5–5.3)
PROT SERPL-MCNC: 7.6 G/DL — SIGNIFICANT CHANGE UP (ref 6–8.3)
SODIUM SERPL-SCNC: 135 MMOL/L — SIGNIFICANT CHANGE UP (ref 135–145)

## 2024-11-27 ENCOUNTER — OUTPATIENT (OUTPATIENT)
Dept: OUTPATIENT SERVICES | Facility: HOSPITAL | Age: 39
LOS: 1 days | End: 2024-11-27
Payer: COMMERCIAL

## 2024-11-27 ENCOUNTER — APPOINTMENT (OUTPATIENT)
Dept: OBGYN | Facility: CLINIC | Age: 39
End: 2024-11-27
Payer: COMMERCIAL

## 2024-11-27 ENCOUNTER — APPOINTMENT (OUTPATIENT)
Dept: ULTRASOUND IMAGING | Facility: CLINIC | Age: 39
End: 2024-11-27

## 2024-11-27 ENCOUNTER — NON-APPOINTMENT (OUTPATIENT)
Age: 39
End: 2024-11-27

## 2024-11-27 VITALS
SYSTOLIC BLOOD PRESSURE: 132 MMHG | BODY MASS INDEX: 32.47 KG/M2 | WEIGHT: 202 LBS | HEIGHT: 66 IN | DIASTOLIC BLOOD PRESSURE: 82 MMHG

## 2024-11-27 DIAGNOSIS — N94.89 OTHER SPECIFIED CONDITIONS ASSOCIATED WITH FEMALE GENITAL ORGANS AND MENSTRUAL CYCLE: ICD-10-CM

## 2024-11-27 DIAGNOSIS — Z01.419 ENCOUNTER FOR GYNECOLOGICAL EXAMINATION (GENERAL) (ROUTINE) W/OUT ABNORMAL FINDINGS: ICD-10-CM

## 2024-11-27 PROCEDURE — 76856 US EXAM PELVIC COMPLETE: CPT | Mod: 26,59

## 2024-11-27 PROCEDURE — 76856 US EXAM PELVIC COMPLETE: CPT

## 2024-11-27 PROCEDURE — 76830 TRANSVAGINAL US NON-OB: CPT

## 2024-11-27 PROCEDURE — 76830 TRANSVAGINAL US NON-OB: CPT | Mod: 26

## 2024-11-27 PROCEDURE — 99213 OFFICE O/P EST LOW 20 MIN: CPT

## 2024-12-02 ENCOUNTER — NON-APPOINTMENT (OUTPATIENT)
Age: 39
End: 2024-12-02

## 2024-12-12 ENCOUNTER — APPOINTMENT (OUTPATIENT)
Dept: OBGYN | Facility: CLINIC | Age: 39
End: 2024-12-12
Payer: COMMERCIAL

## 2024-12-12 ENCOUNTER — APPOINTMENT (OUTPATIENT)
Dept: ANTEPARTUM | Facility: CLINIC | Age: 39
End: 2024-12-12
Payer: COMMERCIAL

## 2024-12-12 ENCOUNTER — ASOB RESULT (OUTPATIENT)
Age: 39
End: 2024-12-12

## 2024-12-12 VITALS
HEIGHT: 66 IN | WEIGHT: 204.13 LBS | SYSTOLIC BLOOD PRESSURE: 106 MMHG | BODY MASS INDEX: 32.81 KG/M2 | DIASTOLIC BLOOD PRESSURE: 72 MMHG

## 2024-12-12 DIAGNOSIS — O02.1 MISSED ABORTION: ICD-10-CM

## 2024-12-12 PROCEDURE — 99213 OFFICE O/P EST LOW 20 MIN: CPT

## 2024-12-12 PROCEDURE — 76817 TRANSVAGINAL US OBSTETRIC: CPT

## 2024-12-12 PROCEDURE — 76801 OB US < 14 WKS SINGLE FETUS: CPT

## 2024-12-12 RX ORDER — OXYCODONE 5 MG/1
5 TABLET ORAL
Qty: 5 | Refills: 0 | Status: ACTIVE | COMMUNITY
Start: 2024-12-12 | End: 1900-01-01

## 2024-12-12 RX ORDER — MISOPROSTOL 200 UG/1
200 TABLET ORAL
Qty: 8 | Refills: 0 | Status: ACTIVE | COMMUNITY
Start: 2024-12-12 | End: 1900-01-01

## 2024-12-31 ENCOUNTER — APPOINTMENT (OUTPATIENT)
Dept: OBGYN | Facility: CLINIC | Age: 39
End: 2024-12-31
Payer: COMMERCIAL

## 2024-12-31 VITALS
HEIGHT: 66 IN | BODY MASS INDEX: 33.45 KG/M2 | SYSTOLIC BLOOD PRESSURE: 125 MMHG | WEIGHT: 208.13 LBS | DIASTOLIC BLOOD PRESSURE: 80 MMHG

## 2024-12-31 DIAGNOSIS — O02.1 MISSED ABORTION: ICD-10-CM

## 2024-12-31 PROCEDURE — 76857 US EXAM PELVIC LIMITED: CPT

## 2024-12-31 PROCEDURE — 99213 OFFICE O/P EST LOW 20 MIN: CPT | Mod: 25

## 2025-02-09 ENCOUNTER — EMERGENCY (EMERGENCY)
Facility: HOSPITAL | Age: 40
LOS: 1 days | Discharge: HOME | End: 2025-02-09
Attending: STUDENT IN AN ORGANIZED HEALTH CARE EDUCATION/TRAINING PROGRAM
Payer: COMMERCIAL

## 2025-02-09 VITALS
TEMPERATURE: 98 F | RESPIRATION RATE: 17 BRPM | OXYGEN SATURATION: 100 % | SYSTOLIC BLOOD PRESSURE: 96 MMHG | DIASTOLIC BLOOD PRESSURE: 53 MMHG | HEART RATE: 74 BPM

## 2025-02-09 VITALS
OXYGEN SATURATION: 99 % | HEART RATE: 88 BPM | DIASTOLIC BLOOD PRESSURE: 89 MMHG | SYSTOLIC BLOOD PRESSURE: 125 MMHG | RESPIRATION RATE: 18 BRPM | TEMPERATURE: 99 F

## 2025-02-09 LAB
ALBUMIN SERPL ELPH-MCNC: 4 G/DL — SIGNIFICANT CHANGE UP (ref 3.3–5)
ALP SERPL-CCNC: 60 U/L — SIGNIFICANT CHANGE UP (ref 40–120)
ALT FLD-CCNC: 11 U/L — SIGNIFICANT CHANGE UP (ref 10–45)
ANION GAP SERPL CALC-SCNC: 11 MMOL/L — SIGNIFICANT CHANGE UP (ref 5–17)
AST SERPL-CCNC: 12 U/L — SIGNIFICANT CHANGE UP (ref 10–40)
BASOPHILS # BLD AUTO: 0.03 K/UL — SIGNIFICANT CHANGE UP (ref 0–0.2)
BASOPHILS NFR BLD AUTO: 0.4 % — SIGNIFICANT CHANGE UP (ref 0–2)
BILIRUB SERPL-MCNC: 0.4 MG/DL — SIGNIFICANT CHANGE UP (ref 0.2–1.2)
BUN SERPL-MCNC: 9 MG/DL — SIGNIFICANT CHANGE UP (ref 7–23)
CALCIUM SERPL-MCNC: 9.4 MG/DL — SIGNIFICANT CHANGE UP (ref 8.4–10.5)
CHLORIDE SERPL-SCNC: 104 MMOL/L — SIGNIFICANT CHANGE UP (ref 96–108)
CO2 SERPL-SCNC: 23 MMOL/L — SIGNIFICANT CHANGE UP (ref 22–31)
CREAT SERPL-MCNC: 0.67 MG/DL — SIGNIFICANT CHANGE UP (ref 0.5–1.3)
EGFR: 114 ML/MIN/1.73M2 — SIGNIFICANT CHANGE UP
EOSINOPHIL # BLD AUTO: 0.09 K/UL — SIGNIFICANT CHANGE UP (ref 0–0.5)
EOSINOPHIL NFR BLD AUTO: 1.2 % — SIGNIFICANT CHANGE UP (ref 0–6)
FLUAV AG NPH QL: SIGNIFICANT CHANGE UP
FLUBV AG NPH QL: SIGNIFICANT CHANGE UP
GLUCOSE SERPL-MCNC: 100 MG/DL — HIGH (ref 70–99)
HCG SERPL-ACNC: 7.8 MIU/ML — HIGH
HCT VFR BLD CALC: 35.4 % — SIGNIFICANT CHANGE UP (ref 34.5–45)
HGB BLD-MCNC: 11.3 G/DL — LOW (ref 11.5–15.5)
IMM GRANULOCYTES NFR BLD AUTO: 0.4 % — SIGNIFICANT CHANGE UP (ref 0–0.9)
LYMPHOCYTES # BLD AUTO: 2.37 K/UL — SIGNIFICANT CHANGE UP (ref 1–3.3)
LYMPHOCYTES # BLD AUTO: 31.2 % — SIGNIFICANT CHANGE UP (ref 13–44)
MCHC RBC-ENTMCNC: 29.9 PG — SIGNIFICANT CHANGE UP (ref 27–34)
MCHC RBC-ENTMCNC: 31.9 G/DL — LOW (ref 32–36)
MCV RBC AUTO: 93.7 FL — SIGNIFICANT CHANGE UP (ref 80–100)
MONOCYTES # BLD AUTO: 0.41 K/UL — SIGNIFICANT CHANGE UP (ref 0–0.9)
MONOCYTES NFR BLD AUTO: 5.4 % — SIGNIFICANT CHANGE UP (ref 2–14)
NEUTROPHILS # BLD AUTO: 4.67 K/UL — SIGNIFICANT CHANGE UP (ref 1.8–7.4)
NEUTROPHILS NFR BLD AUTO: 61.4 % — SIGNIFICANT CHANGE UP (ref 43–77)
NRBC # BLD: 0 /100 WBCS — SIGNIFICANT CHANGE UP (ref 0–0)
NRBC BLD-RTO: 0 /100 WBCS — SIGNIFICANT CHANGE UP (ref 0–0)
PLATELET # BLD AUTO: 330 K/UL — SIGNIFICANT CHANGE UP (ref 150–400)
POTASSIUM SERPL-MCNC: 4.3 MMOL/L — SIGNIFICANT CHANGE UP (ref 3.5–5.3)
POTASSIUM SERPL-SCNC: 4.3 MMOL/L — SIGNIFICANT CHANGE UP (ref 3.5–5.3)
PROT SERPL-MCNC: 7.5 G/DL — SIGNIFICANT CHANGE UP (ref 6–8.3)
RBC # BLD: 3.78 M/UL — LOW (ref 3.8–5.2)
RBC # FLD: 14.1 % — SIGNIFICANT CHANGE UP (ref 10.3–14.5)
RSV RNA NPH QL NAA+NON-PROBE: SIGNIFICANT CHANGE UP
SARS-COV-2 RNA SPEC QL NAA+PROBE: SIGNIFICANT CHANGE UP
SODIUM SERPL-SCNC: 138 MMOL/L — SIGNIFICANT CHANGE UP (ref 135–145)
TROPONIN T, HIGH SENSITIVITY RESULT: <6 NG/L — SIGNIFICANT CHANGE UP (ref 0–51)
WBC # BLD: 7.6 K/UL — SIGNIFICANT CHANGE UP (ref 3.8–10.5)
WBC # FLD AUTO: 7.6 K/UL — SIGNIFICANT CHANGE UP (ref 3.8–10.5)

## 2025-02-09 PROCEDURE — 85025 COMPLETE CBC W/AUTO DIFF WBC: CPT

## 2025-02-09 PROCEDURE — 96361 HYDRATE IV INFUSION ADD-ON: CPT

## 2025-02-09 PROCEDURE — 80053 COMPREHEN METABOLIC PANEL: CPT

## 2025-02-09 PROCEDURE — 83605 ASSAY OF LACTIC ACID: CPT

## 2025-02-09 PROCEDURE — 82947 ASSAY GLUCOSE BLOOD QUANT: CPT

## 2025-02-09 PROCEDURE — 84132 ASSAY OF SERUM POTASSIUM: CPT

## 2025-02-09 PROCEDURE — 96374 THER/PROPH/DIAG INJ IV PUSH: CPT

## 2025-02-09 PROCEDURE — 85014 HEMATOCRIT: CPT

## 2025-02-09 PROCEDURE — 84702 CHORIONIC GONADOTROPIN TEST: CPT

## 2025-02-09 PROCEDURE — 99285 EMERGENCY DEPT VISIT HI MDM: CPT

## 2025-02-09 PROCEDURE — 87637 SARSCOV2&INF A&B&RSV AMP PRB: CPT

## 2025-02-09 PROCEDURE — 84484 ASSAY OF TROPONIN QUANT: CPT

## 2025-02-09 PROCEDURE — 82435 ASSAY OF BLOOD CHLORIDE: CPT

## 2025-02-09 PROCEDURE — 84295 ASSAY OF SERUM SODIUM: CPT

## 2025-02-09 PROCEDURE — 93005 ELECTROCARDIOGRAM TRACING: CPT

## 2025-02-09 PROCEDURE — 73030 X-RAY EXAM OF SHOULDER: CPT | Mod: 26,RT

## 2025-02-09 PROCEDURE — 73030 X-RAY EXAM OF SHOULDER: CPT

## 2025-02-09 PROCEDURE — 85018 HEMOGLOBIN: CPT

## 2025-02-09 PROCEDURE — 71046 X-RAY EXAM CHEST 2 VIEWS: CPT

## 2025-02-09 PROCEDURE — 99285 EMERGENCY DEPT VISIT HI MDM: CPT | Mod: 25

## 2025-02-09 PROCEDURE — 82330 ASSAY OF CALCIUM: CPT

## 2025-02-09 PROCEDURE — 71046 X-RAY EXAM CHEST 2 VIEWS: CPT | Mod: 26

## 2025-02-09 PROCEDURE — 82803 BLOOD GASES ANY COMBINATION: CPT

## 2025-02-09 RX ORDER — BACTERIOSTATIC SODIUM CHLORIDE 0.9 %
1000 VIAL (ML) INJECTION ONCE
Refills: 0 | Status: COMPLETED | OUTPATIENT
Start: 2025-02-09 | End: 2025-02-09

## 2025-02-09 RX ORDER — METOCLOPRAMIDE 10 MG/1
10 TABLET ORAL ONCE
Refills: 0 | Status: COMPLETED | OUTPATIENT
Start: 2025-02-09 | End: 2025-02-09

## 2025-02-09 RX ADMIN — Medication 1000 MILLILITER(S): at 15:00

## 2025-02-09 RX ADMIN — Medication 1000 MILLILITER(S): at 13:56

## 2025-02-09 RX ADMIN — METOCLOPRAMIDE 10 MILLIGRAM(S): 10 TABLET ORAL at 13:56

## 2025-02-09 NOTE — ED PROVIDER NOTE - PROGRESS NOTE DETAILS
MD Aretha (PGY-3) patient reassessed.  Patient reports improvement in symptoms.  Patient's labs and imaging reviewed.  Patient beta-hCG notably elevated at 7.8.  Patient denies any abdominal pain or vaginal bleeding.  Had discussion with patient concerning head CAT scan.  Risk and benefits discussed with patient.  Patient would prefer not to obtain the head CT.  As per [facility] head CT rules, patient does not require imaging.  Plan for discharge with patient to follow-up with OB tomorrow in the office.

## 2025-02-09 NOTE — ED PROVIDER NOTE - PATIENT PORTAL LINK FT
You can access the FollowMyHealth Patient Portal offered by [facility] by registering at the following website: [email or web address]. By joining [facility]’s FollowMyHealth portal, you will also be able to view your health information using other applications (apps) compatible with our system.

## 2025-02-09 NOTE — ED PROVIDER NOTE - OBJECTIVE STATEMENT
39-year-old female with no significant past medical history, presenting to emergency room with headache.  Patient states that she felt very lightheaded yesterday, which resulted in a fall.  Reports head strike, denies LOC, nausea/vomiting since fall.  Patient denies any motor deficits or numbness.  Patient has attempted take Tylenol with minimal relief of symptoms.  Of note patient was recently started taking medications in preparation for IVF.

## 2025-02-09 NOTE — ED PROVIDER NOTE - NSFOLLOWUPINSTRUCTIONS_ED_ALL_ED_FT
You were seen in the Emergency Department for headache after a fall.  You received lab work and x-rays of your chest and shoulder to evaluate for your pain after a fall.  Your x-rays were negative for fractures.  Your beta-hCG which is possible pregnancy was elevated at 7.8.    1) Advance activity as tolerated.   2) Continue all previously prescribed medications as directed.    3) Follow up with your OB doctor at your appointment tomorrow- take copies of your results.    4) Return to the Emergency Department for worsening or persistent symptoms, worsening nausea, vomiting, change in mental state, abdominal, vaginal bleeding, and/or ANY NEW OR CONCERNING SYMPTOMS.

## 2025-02-09 NOTE — ED ADULT NURSE NOTE - NSFALLTYPE_ED_ALL_ED
dizziness and not feeling right yesterday, bumped head and fell/Anticipated physiological fall (ex: syncope)

## 2025-02-09 NOTE — ED PROVIDER NOTE - PHYSICAL EXAMINATION
Const: not in acute distress  Eyes: no conjunctival injection. PERRL  HEENT: Head NCAT, Moist MM.  Neck: Trachea midline.  NO midline cervical tenderness.  CVS: +S1/S2, No murmurs or gallops. mild chest wall tenderness to posterior inferior chest.  RESP: Unlabored respiratory effort. Clear to auscultation bilaterally.  GI: Nontender/Nondistended, No CVA tenderness b/l.   Skin: Intact.   MSK: No midline spinal tenderness. No gross deformities.  Pain with ROM of L shoulder.  Neuro: moving all four extremities  Psych: Awake, Alert, & Cooperative

## 2025-02-09 NOTE — ED PROVIDER NOTE - ATTENDING CONTRIBUTION TO CARE
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

## 2025-02-09 NOTE — ED ADULT NURSE NOTE - OBJECTIVE STATEMENT
1300 39 yr old Female c/o HA. s/p fell and bumped head in bathroom yesterday at home. Denies LOC. States she felt dizzy and didn't feel right at that time. Denies fever, chills, cough, palp or chest pain. A&Ox4. PERRL.MAEx4.  Skin W&D. Lips and nailbeds pink. Dr calhoun pt. Cardiac monitor applied. 12 Lead EKG done 1310 39 yr old Female c/o HA. s/p fell and bumped head in bathroom yesterday at home. Denies LOC. States she felt dizzy and didn't feel right at that time. Denies fever, chills, cough, palp or chest pain. A&Ox4. PERRL.MAEx4.  Skin W&D. Lips and nailbeds pink. Dr calhoun pt. Cardiac monitor applied. 12 Lead EKG done. Fall risk precautions maintained 1310 39 yr old Female c/o HA. s/p fell and bumped head in bathroom yesterday at home. Denies LOC. States she felt dizzy and didn't feel right at that time. Denies fever, chills, cough, palp or chest pain. A&Ox4. PERRL.MAEx4.  Skin W&D. Lips and nailbeds pink. Dr calhoun pt. Cardiac monitor applied. 12 Lead EKG done. Fall risk precautions maintained. Droplet precautions maintained. 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 States she was negative for Influenza yesterday

## 2025-02-09 NOTE — ED ADULT TRIAGE NOTE - CHIEF COMPLAINT QUOTE
felt dizzy yesterday fell hit head in bathroom now coming in with head pain, no medical issues no weakness or neuro symptoms, no loc

## 2025-02-09 NOTE — ED ADULT NURSE NOTE - NS ED NURSE LEVEL OF CONSCIOUSNESS ORIENTATION
PAST SURGICAL HISTORY:  No significant past surgical history Oriented - self; Oriented - place; Oriented - time

## 2025-02-09 NOTE — ED PROVIDER NOTE - CLINICAL SUMMARY MEDICAL DECISION MAKING FREE TEXT BOX
39-year-old female with no significant past medical history, presenting to emergency room with headache. 39-year-old female with no significant past medical history, presenting to emergency room with headache.  Vitals nonactionable.  Physical exam with heart regular rate and rhythm, lungs clear to station, abdomen soft also nontender.  Patient with pain with range of motion of right shoulder.  Patient also some tenderness to palpation of right posterior inferior chest.  Patient without pelvic instability, gross deformities, midline spinal tenderness.  Will obtain lab work including CBC, CMP, troponin, EKG, chest x-ray, x-ray shoulder, CT head.

## 2025-02-09 NOTE — ED ADULT NURSE NOTE - NSFALLHARMRISKINTERV_ED_ALL_ED

## 2025-04-16 NOTE — ED PROVIDER NOTE - OBJECTIVE STATEMENT
Weight Management Nutrition Class - Virtual    Diagnosis: Obesity    Bariatric Surgeon: Dr. Dickens  Surgery: Vertical Sleeve Gastrectomy on 3/10/25    Class: 5 week post op     Topics discussed today include:     fluid goals post op, protein goals post op, constipation, chew food well, exercise, avoidance of alcohol, PPI use, diet progression, hypoglycemia, dumping syndrome, protein supplems, vitamin/mineral supplements, and calcium supplements    Patient was able to verbalize basic diet (protein, fluid, vitamin and mineral) recommendations and possible nutrition-related complications. Yes        SEE MDM FOR HPI

## (undated) DEVICE — LIGASURE MARYLAND 37CM

## (undated) DEVICE — ELCTR DISSECTOR ULTRASONIC CORDLESS CVD JAW 5MM-39CM

## (undated) DEVICE — SYR LUER LOK 10CC

## (undated) DEVICE — PREP BETADINE KIT

## (undated) DEVICE — STAPLER SKIN VISI-STAT 35 WIDE

## (undated) DEVICE — D HELP - CLEARVIEW CLEARIFY SYSTEM

## (undated) DEVICE — ENDOCATCH 10MM SPECIMEN POUCH

## (undated) DEVICE — UTERINE MANIPULATOR COOPER SURGICAL 5MM 33CM GREEN

## (undated) DEVICE — SCOPE WARMER SEAL DISP

## (undated) DEVICE — BLADE SCALPEL SAFETYLOCK #10

## (undated) DEVICE — TROCAR COVIDIEN VERSASTEP SLEEVE

## (undated) DEVICE — TUBING SUCTION 20FT

## (undated) DEVICE — FOLEY TRAY 16FR 5CC LTX UMETER CLOSED

## (undated) DEVICE — PREP CHLORAPREP HI-LITE ORANGE 26ML

## (undated) DEVICE — TUBING STRYKEFLOW II SUCTION / IRRIGATOR

## (undated) DEVICE — MARKING PEN W RULER

## (undated) DEVICE — TUBING INSUFFLATION LAP FILTER 10FT

## (undated) DEVICE — DRAPE 3/4 SHEET W REINFORCEMENT 56X77"

## (undated) DEVICE — TROCAR COVIDIEN STEP 5MM SHORT 70MM

## (undated) DEVICE — SUT POLYSORB 0 30" GS-23

## (undated) DEVICE — VALVE YELLOW PORT SEAL PLUS 5MM

## (undated) DEVICE — VISITEC 4X4

## (undated) DEVICE — TUBING TUR 2 PRONG

## (undated) DEVICE — TROCAR GELPOINT MINI ADVANCED

## (undated) DEVICE — DRAPE MAYO STAND 30"

## (undated) DEVICE — SPECIMEN CONTAINER 100ML

## (undated) DEVICE — PREP BETADINE SPONGE STICKS

## (undated) DEVICE — LAP PAD 18 X 18"

## (undated) DEVICE — TROCAR APPLIED MEDICAL KII BALLOON BLUNT TIP 12MM X 100MM

## (undated) DEVICE — GOWN TRIMAX LG

## (undated) DEVICE — GLV 8.5 PROTEXIS (WHITE)

## (undated) DEVICE — LIGASURE ATLAS 10MM 37CM

## (undated) DEVICE — PACK GYN LAPAROSCOPY

## (undated) DEVICE — MEDICATION LABELS W MARKER

## (undated) DEVICE — GLV 7 PROTEXIS (WHITE)

## (undated) DEVICE — TROCAR COVIDIEN VERSASTEP PLUS 11MM STANDARD

## (undated) DEVICE — DISSECTOR ENDO PEANUT 5MM

## (undated) DEVICE — DRSG TELFA 3 X 8

## (undated) DEVICE — SOL IRR POUR NS 0.9% 500ML

## (undated) DEVICE — DRAPE LIGHT HANDLE COVER (BLUE)

## (undated) DEVICE — POSITIONER FOAM EGG CRATE ULNAR 2PCS (PINK)

## (undated) DEVICE — NDL HYPO SAFE 22G X 1.5" (BLACK)

## (undated) DEVICE — UTERINE MANIPULATOR CLINICAL INNOVATIONS CLEARVIEW 7CM

## (undated) DEVICE — TROCAR COVIDIEN VERSASTEP PLUS 12MM STANDARD

## (undated) DEVICE — INSUFFLATION NDL COVIDIEN STEP 14G FOR STEP/VERSASTEP

## (undated) DEVICE — BLADE SCALPEL SAFETYLOCK #15

## (undated) DEVICE — Device

## (undated) DEVICE — GLV 6.5 PROTEXIS (WHITE)

## (undated) DEVICE — DRSG STERISTRIPS 0.5 X 4"

## (undated) DEVICE — DISSECTOR COVIDIEN ROTICULATOR 5MM W MONOPOLAR CAUTERY

## (undated) DEVICE — VENODYNE/SCD SLEEVE CALF LARGE

## (undated) DEVICE — LAPSAC SURGICAL TISSUE POUCH 2X5"

## (undated) DEVICE — UTERINE MANIPULATOR CONMED VCARE LG 37MM

## (undated) DEVICE — WARMING BLANKET UPPER ADULT

## (undated) DEVICE — NDL HYPO SAFE 18G X 1.5" (PINK)

## (undated) DEVICE — UTERINE MANIPULATOR CONMED VCARE SM 32MM

## (undated) DEVICE — DRAPE INSTRUMENT POUCH 6.75" X 11"

## (undated) DEVICE — GRASPER COVIDIEN ENDO GRASP ROTICULATOR 5MM W SPIN LOCK

## (undated) DEVICE — UTERINE MANIPULATOR CONMED VCARE MED 34MM

## (undated) DEVICE — DRAPE 1/2 SHEET 40X57"

## (undated) DEVICE — TROCAR COVIDIEN BLUNT TIP HASSAN 10MM

## (undated) DEVICE — ENDOCATCH II 15MM

## (undated) DEVICE — SOL IRR POUR H2O 250ML

## (undated) DEVICE — LIGASURE BLUNT TIP 37CM

## (undated) DEVICE — GLV 8 PROTEXIS (WHITE)

## (undated) DEVICE — SUT BIOSYN 4-0 18" P-12

## (undated) DEVICE — TROCAR COVIDIEN VERSAONE BLADED FIXATION 11MM STANDARD

## (undated) DEVICE — DRAPE GENERAL ENDOSCOPY

## (undated) DEVICE — STAPLER COVIDIEN ENDO GIA STANDARD HANDLE

## (undated) DEVICE — DRAPE TOWEL BLUE 17" X 24"

## (undated) DEVICE — DRSG MASTISOL

## (undated) DEVICE — LAPSAC SURGICAL TISSUE POUCH 8X5"

## (undated) DEVICE — SHEARS COVIDIEN ENDO SHEAR 5MM X 31CM W UNIPOLAR CAUTERY

## (undated) DEVICE — FOLEY TRAY 16FR LF URINE METER SURESTEP

## (undated) DEVICE — NDL COUNTER FOAM AND MAGNET 40-70

## (undated) DEVICE — GLV 7.5 PROTEXIS (WHITE)

## (undated) DEVICE — DRSG OPSITE 2.5 X 2"